# Patient Record
Sex: FEMALE | URBAN - METROPOLITAN AREA
[De-identification: names, ages, dates, MRNs, and addresses within clinical notes are randomized per-mention and may not be internally consistent; named-entity substitution may affect disease eponyms.]

---

## 2018-10-31 ENCOUNTER — APPOINTMENT (RX ONLY)
Dept: URBAN - METROPOLITAN AREA CLINIC 135 | Facility: CLINIC | Age: 63
Setting detail: DERMATOLOGY
End: 2018-10-31

## 2018-10-31 DIAGNOSIS — Z12.83 ENCOUNTER FOR SCREENING FOR MALIGNANT NEOPLASM OF SKIN: ICD-10-CM

## 2018-10-31 DIAGNOSIS — L82.1 OTHER SEBORRHEIC KERATOSIS: ICD-10-CM

## 2018-10-31 DIAGNOSIS — D18.0 HEMANGIOMA: ICD-10-CM

## 2018-10-31 DIAGNOSIS — L82.0 INFLAMED SEBORRHEIC KERATOSIS: ICD-10-CM

## 2018-10-31 DIAGNOSIS — L85.3 XEROSIS CUTIS: ICD-10-CM

## 2018-10-31 DIAGNOSIS — L91.8 OTHER HYPERTROPHIC DISORDERS OF THE SKIN: ICD-10-CM

## 2018-10-31 PROBLEM — E03.9 HYPOTHYROIDISM, UNSPECIFIED: Status: ACTIVE | Noted: 2018-10-31

## 2018-10-31 PROBLEM — E13.9 OTHER SPECIFIED DIABETES MELLITUS WITHOUT COMPLICATIONS: Status: ACTIVE | Noted: 2018-10-31

## 2018-10-31 PROBLEM — H91.90 UNSPECIFIED HEARING LOSS, UNSPECIFIED EAR: Status: ACTIVE | Noted: 2018-10-31

## 2018-10-31 PROBLEM — D18.01 HEMANGIOMA OF SKIN AND SUBCUTANEOUS TISSUE: Status: ACTIVE | Noted: 2018-10-31

## 2018-10-31 PROCEDURE — ? SHAVE REMOVAL

## 2018-10-31 PROCEDURE — 11306 SHAVE SKIN LESION 0.6-1.0 CM: CPT | Mod: 59

## 2018-10-31 PROCEDURE — ? PATHOLOGY BILLING

## 2018-10-31 PROCEDURE — 88304 TISSUE EXAM BY PATHOLOGIST: CPT

## 2018-10-31 PROCEDURE — ? RECOMMENDATIONS

## 2018-10-31 PROCEDURE — ? PRESCRIPTION

## 2018-10-31 PROCEDURE — 17110 DESTRUCTION B9 LES UP TO 14: CPT

## 2018-10-31 PROCEDURE — 99203 OFFICE O/P NEW LOW 30 MIN: CPT | Mod: 25

## 2018-10-31 PROCEDURE — ? LIQUID NITROGEN

## 2018-10-31 PROCEDURE — ? COUNSELING

## 2018-10-31 PROCEDURE — ? DEFER

## 2018-10-31 RX ORDER — SOD,AMMONIUM,POTASSIUM LACTATE
LOTION (GRAM) TOPICAL
Qty: 1 | Refills: 3 | Status: ERX | COMMUNITY
Start: 2018-10-31

## 2018-10-31 RX ADMIN — Medication: at 21:45

## 2018-10-31 ASSESSMENT — LOCATION ZONE DERM
LOCATION ZONE: VULVA
LOCATION ZONE: TRUNK

## 2018-10-31 ASSESSMENT — LOCATION SIMPLE DESCRIPTION DERM
LOCATION SIMPLE: RIGHT UPPER BACK
LOCATION SIMPLE: GROIN
LOCATION SIMPLE: LEFT UPPER BACK

## 2018-10-31 ASSESSMENT — LOCATION DETAILED DESCRIPTION DERM
LOCATION DETAILED: LEFT MEDIAL UPPER BACK
LOCATION DETAILED: RIGHT MID-UPPER BACK
LOCATION DETAILED: MONS PUBIS
LOCATION DETAILED: RIGHT INFERIOR UPPER BACK

## 2018-10-31 NOTE — PROCEDURE: RECOMMENDATIONS
Detail Level: Zone
Recommendation Preamble: The following recommendations were made during the visit:
Recommendations (Free Text): Amlactin daily

## 2018-10-31 NOTE — PROCEDURE: PATHOLOGY BILLING
Number Of Specimens Per Diagnosis: 1
Cpt Code (02091, 22449 Or 79757): 86740
Rendering Text In Billing: The biopsy specimens were grossed and processed into slides.
Rendering Text In Billing: The biopsy specimen was grossed and processed into a slide.
Number Of Group I Special Stains Used (52242): None
Detail Level: Detailed
Rendering Text In Billing: The slides were read, and reported in an attached document.
Professional Component, Technical Component Or Both?: professional and technical component
Immunohistochemistry (81279 and 37360) billing is not performed here. Please use the Immunohistochemistry Stain Billing plan to accomplish this.
Rendering Text In Billing: The slide was read, and reported in an attached document.

## 2018-10-31 NOTE — PROCEDURE: SHAVE REMOVAL
Detail Level: Detailed
Anesthesia Type: 1% lidocaine with epinephrine
Size Of Lesion In Cm (Required): 0.8
Biopsy Method: Personna blade
Was A Bandage Applied: Yes
Path Notes (To The Dermatopathologist): Please check margins.
Consent was obtained from the patient. The risks and benefits to therapy were discussed in detail. Specifically, the risks of infection, scarring, bleeding, prolonged wound healing, incomplete removal, allergy to anesthesia, nerve injury and recurrence were addressed. Prior to the procedure, the treatment site was clearly identified and confirmed by the patient. All components of Universal Protocol/PAUSE Rule completed.
Bill 97826 For Specimen Handling/Conveyance To Laboratory?: no
Notification Instructions: Patient will be notified of biopsy results. However, patient instructed to call the office if not contacted within 2 weeks.
X Size Of Lesion In Cm (Optional): 0
Anesthesia Volume In Cc: 3
Medical Necessity Information: It is in your best interest to select a reason for this procedure from the list below. All of these items fulfill various CMS LCD requirements except the new and changing color options.
Billing Type: Third-Party Bill
Post-Care Instructions: I reviewed with the patient in detail post-care instructions. Patient is to keep the biopsy site dry overnight, and then apply bacitracin twice daily until healed. Patient may apply hydrogen peroxide soaks to remove any crusting.
Medical Necessity Clause: This procedure was medically necessary because the lesion that was treated was:
Hemostasis: Electrodesiccation
Wound Care: Petrolatum